# Patient Record
Sex: MALE | Race: WHITE | NOT HISPANIC OR LATINO | Employment: FULL TIME | URBAN - NONMETROPOLITAN AREA
[De-identification: names, ages, dates, MRNs, and addresses within clinical notes are randomized per-mention and may not be internally consistent; named-entity substitution may affect disease eponyms.]

---

## 2018-04-02 ENCOUNTER — OFFICE VISIT (OUTPATIENT)
Dept: URGENT CARE | Facility: PHYSICIAN GROUP | Age: 58
End: 2018-04-02

## 2018-04-02 VITALS
HEIGHT: 73 IN | OXYGEN SATURATION: 96 % | SYSTOLIC BLOOD PRESSURE: 126 MMHG | DIASTOLIC BLOOD PRESSURE: 84 MMHG | TEMPERATURE: 98.3 F | HEART RATE: 82 BPM | WEIGHT: 161 LBS | BODY MASS INDEX: 21.34 KG/M2

## 2018-04-02 DIAGNOSIS — Z02.4 ENCOUNTER FOR CDL (COMMERCIAL DRIVING LICENSE) EXAM: ICD-10-CM

## 2018-04-02 PROCEDURE — 7100 PR DOT PHYSICAL: Performed by: PHYSICIAN ASSISTANT

## 2018-04-02 NOTE — PROGRESS NOTES
58 y.o. male comes in for aDOT physical. No major medical history, no chronic conditions, no chronic medications. No history of asthma, heart disease, murmurs, seizure disorder or syncopal episodes with activity.  Please see the chart for further information, however normal physical exam, cleared for 2 year certificatet without restrictions.

## 2020-03-18 ENCOUNTER — OCCUPATIONAL MEDICINE (OUTPATIENT)
Dept: URGENT CARE | Facility: PHYSICIAN GROUP | Age: 60
End: 2020-03-18
Payer: COMMERCIAL

## 2020-03-18 ENCOUNTER — NON-PROVIDER VISIT (OUTPATIENT)
Dept: URGENT CARE | Facility: PHYSICIAN GROUP | Age: 60
End: 2020-03-18

## 2020-03-18 VITALS
TEMPERATURE: 98 F | RESPIRATION RATE: 16 BRPM | SYSTOLIC BLOOD PRESSURE: 138 MMHG | BODY MASS INDEX: 20.94 KG/M2 | HEART RATE: 84 BPM | OXYGEN SATURATION: 98 % | WEIGHT: 158 LBS | DIASTOLIC BLOOD PRESSURE: 90 MMHG | HEIGHT: 73 IN

## 2020-03-18 DIAGNOSIS — Z02.1 PRE-EMPLOYMENT DRUG SCREENING: ICD-10-CM

## 2020-03-18 DIAGNOSIS — S46.912A STRAIN OF LEFT SHOULDER, INITIAL ENCOUNTER: ICD-10-CM

## 2020-03-18 LAB
AMP AMPHETAMINE: NORMAL
COC COCAINE: NORMAL
INT CON NEG: NORMAL
INT CON POS: NORMAL
MET METHAMPHETAMINES: NORMAL
OPI OPIATES: NORMAL
PCP PHENCYCLIDINE: NORMAL
POC DRUG COMMENT 753798-OCCUPATIONAL HEALTH: NEGATIVE
THC: NORMAL

## 2020-03-18 PROCEDURE — 80305 DRUG TEST PRSMV DIR OPT OBS: CPT | Performed by: PHYSICIAN ASSISTANT

## 2020-03-18 PROCEDURE — 99213 OFFICE O/P EST LOW 20 MIN: CPT | Mod: 29 | Performed by: PHYSICIAN ASSISTANT

## 2020-03-18 NOTE — LETTER
[facility]  [facility] [address]  [place] [address]  Phone:  [phone] - Fax:  [phone]   [facility] Progress Report and Disability Certification  Date of Service: 3/18/2020   No Show:  No  Date / Time of Next Visit: 3/25/2020   Claim Information   Patient Name: Ashwin Barrios  Claim Number:     Employer: [employer]  Date of Injury: 3/12/2020     Insurer / TPA: [facility]  ID / SSN:     Occupation: SANITATION    Diagnosis: The encounter diagnosis was Strain of left shoulder, initial encounter.    Medical Information   Related to Industrial Injury? Yes    Subjective Complaints:  Left shoulder pain after lifting a pallet at work   Objective Findings:  Left shoulder is without any visual deformity, erythema, edema or ecchymosis.  He does have some tenderness to palpation to the lateral joint line.  He has good distal circulation and sensation.  Range of motion is preserved but painful, particularly with flexion and extension of the shoulder as well as abduction.     Pre-Existing Condition(s):     Assessment:   Initial Visit    Status: Additional Care Required  Comments:Follow-up in 1 week  Permanent Disability:No    Plan: Medication  Comments:Over-the-counter anti-inflammatories    Diagnostics:      Comments:       Disability Information   Status: Released to Restricted Duty    From:  3/18/2020  Through: 3/25/2020 Restrictions are: Temporary   Physical Restrictions   Sitting:    Standing:    Stooping:    Bending:      Squatting:    Walking:    Climbin hrs/day Pushin hrs/day  Comments:With left arm   Pullin hrs/day  Comments:With left arm Other:    Reaching Above Shoulder (L): 0 hrs/day Reaching Above Shoulder (R):       Reaching Below Shoulder (L):  0 hrs/day Reaching Below Shoulder (R):      Not to exceed Weight Limits   Carrying(hrs):   Weight Limit(lb): < or = to 10 pounds  Comments:With left arm Lifting(hrs):   Weight  Limit(lb): <  or = to 10 pounds  Comments:With left arm   Comments: No pushing, pulling or lifting more than 10 pounds with left arm.  No operating heavy machinery, no climbing ladders.    Repetitive Actions   Hands: i.e. Fine Manipulations from Grasping:     Feet: i.e. Operating Foot Controls:     Driving / Operate Machinery:     Physician Name: Linda Obrien P.A.-C. Physician Signature: LINDA [place] P.A.-C. e-Signature: Dr. Amando Jain, Medical Director   Clinic Name / Location: [address] [address] Clinic Phone Number: Dept: [phone]   Appointment Time: 9:35 Am Visit Start Time: 10:07 AM   Check-In Time:  9:45 Am Visit Discharge Time:  10:27 AM   Original-Treating Physician or Chiropractor    Page 2-Insurer/TPA    Page 3-Employer    Page 4-Employee

## 2020-03-18 NOTE — LETTER
"EMPLOYEE’S CLAIM FOR COMPENSATION/ REPORT OF INITIAL TREATMENT  FORM C-4    EMPLOYEE’S CLAIM - PROVIDE ALL INFORMATION REQUESTED   First Name  Ashwin Last Name  Cross Birthdate                    1960                Sex  male Claim Number   Home Address  [place] Age  60 y.o. 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  [address]    Date of Injury  3/12/2020               Hour of Injury  3:00 PM Date Employer Notified  3/18/2020 Last Day of Work after Injury or Occupational Disease  3/17/2020 Supervisor to Whom Injury Reported  STEPHANI    Address or Location of Accident (if applicable)  [IN ALLEY NEAR [address] [place] [address] ]   What were you doing at the time of accident? (if applicable)  LIFTING A HEAVY  PALET     How did this injury or occupational disease occur? (Be specific an answer in detail. Use additional sheet if necessary)  LIFTING A HEAVY PALET INTO A TRAILER    If you believe that you have an occupational disease, when did you first have knowledge of the disability and it relationship to your employment?  NA Witnesses to the Accident  JOSE MIGUEL       Nature of Injury or Occupational Disease  Workers' Compensation  Part(s) of Body Injured or Affected  Shoulder (L), ,     I certify that the above is true and correct to the best of my knowledge and that I have provided this information in order to obtain the benefits of [place]  I hereby authorize any physician, chiropractor, surgeon, practitioner, or other person, any hospital, including [facility] or [facility] hospital, any medical service organization, any insurance company, or other institution or organization to release to each other, any medical or other information, including benefits paid or payable, pertinent to this injury or disease, except information relative to diagnosis, treatment and/or counseling for AIDS, psychological conditions, alcohol or controlled substances, for which I must give specific authorization.  A Photostat of this authorization shall be as valid as the original.     Date 03/18/2020   Place [facility]   Employee’s Signature   THIS REPORT MUST BE COMPLETED AND MAILED WITHIN 3 WORKING DAYS OF TREATMENT   Place  [facility]  Name of [facility]   Date  3/18/2020 Diagnosis  (S46.912A) Strain of left shoulder, initial encounter Is there evidence the injured employee was under the influence of alcohol and/or another controlled substance at the time of accident?   Hour  10:07 AM Description of Injury or Disease  The encounter diagnosis was Strain of left shoulder, initial encounter. No   Treatment  Rest, ice, over-the-counter anti-inflammatories, gentle range of motion  Have you advised the patient to remain off work five days or more? No   X-Ray Findings      If Yes   From Date  To Date      From information given by the employee, together with medical evidence, can you directly connect this injury or occupational disease as job incurred?  Yes If No Full Duty No Modified Duty  Yes   Is additional medical care by a physician indicated?  Yes  Comments:Follow-up in a week If Modified Duty, Specify any Limitations / Restrictions  No lifting more than 10 pounds with left arm, no climbing ladders, no operating heavy machinery.   Do you know of any previous injury or  "disease contributing to this condition or occupational disease?                            No   Date  3/18/2020 Print Doctor’s Name Linda Obrien P.A.-C. I certify the employer’s copy of  this form was mailed on:   Address  [address] Insurer’s Use Only     [facility] Zip  [address]    Provider’s Tax ID Number  [id number] Telephone  Dept: [phone]        e-TanyaTALINDA OWENS P.A.-C.   e-Signature: Dr. Amando Jain,   Medical Director Degree  MD        ORIGINAL-TREATING PHYSICIAN OR CHIROPRACTOR    PAGE 2-INSURER/TPA    PAGE 3-EMPLOYER    PAGE 4-EMPLOYEE             Form C-4 (rev.10/07)              BRIEF DESCRIPTION OF RIGHTS AND BENEFITS  (Pursuant to [place] 616C.050)    Notice of Injury or Occupational Disease (Incident Report Form C-1): If an injury or occupational disease (OD) arises out of and in the course of employment, you must provide written notice to your employer as soon as practicable, but no later than 7 days after the accident or OD. Your employer shall maintain a sufficient supply of the required forms.    Claim for Compensation (Form C-4): If medical treatment is sought, the form C-4 is available at the place of initial treatment. A completed \"Claim for Compensation\" (Form C-4) must be filed within 90 days after an accident or OD. The treating physician or chiropractor must, within 3 working days after treatment, complete and mail to the employer, the employer's insurer and third-party , the Claim for Compensation.    Medical Treatment: If you require medical treatment for your on-the-job injury or OD, you may be required to select a physician or chiropractor from a list provided by your workers’ compensation insurer, if it has contracted with an Organization for Managed Care (MCO) or Preferred Provider Organization (PPO) or providers of health care. If your employer has not entered into a contract with an MCO or PPO, you may select a physician or chiropractor " from the Panel of Physicians and Chiropractors. Any medical costs related to your industrial injury or OD will be paid by your insurer.    Temporary Total Disability (TTD): If your doctor has certified that you are unable to work for a period of at least 5 consecutive days, or 5 cumulative days in a 20-day period, or places restrictions on you that your employer does not accommodate, you may be entitled to TTD compensation.    Temporary Partial Disability (TPD): If the wage you receive upon reemployment is less than the compensation for TTD to which you are entitled, the insurer may be required to pay you TPD compensation to make up the difference. TPD can only be paid for a maximum of 24 months.    Permanent Partial Disability (PPD): When your medical condition is stable and there is an indication of a PPD as a result of your injury or OD, within 30 days, your insurer must arrange for an evaluation by a rating physician or chiropractor to determine the degree of your PPD. The amount of your PPD award depends on the date of injury, the results of the PPD evaluation and your age and wage.    Permanent Total Disability (PTD): If you are medically certified by a treating physician or chiropractor as permanently and totally disabled and have been granted a PTD status by your insurer, you are entitled to receive monthly benefits not to exceed 66 2/3% of your average monthly wage. The amount of your PTD payments is subject to reduction if you previously received a PPD award.    Vocational Rehabilitation Services: You may be eligible for vocational rehabilitation services if you are unable to return to the job due to a permanent physical impairment or permanent restrictions as a result of your injury or occupational disease.    Transportation and Per Chelsey Reimbursement: You may be eligible for travel expenses and per chelsey associated with medical treatment.    Reopening: You may be able to reopen your claim if your  condition worsens after claim closure.    Appeal Process: If you disagree with a written determination issued by the insurer or the insurer does not respond to your request, you may appeal to the Department of Administration, , by following the instructions contained in your determination letter. You must appeal the determination within 70 days from the date of the determination letter at [address], [address], [place], [place] [address], or [address], [address],  [address]. If you disagree with the  decision, you may appeal to the Department of Administration, . You must file your appeal within 30 days from the date of the  decision letter at [address], [address], [place], [place] [address], or [address] [facility] [address] [facility] [address],  [address]. If you disagree with a decision of an , you may file a petition for judicial review with the District Court. You must do so within 30 days of the Appeal Officer’s decision. You may be represented by an  at your own expense or you may contact the [facility] for possible representation.    [place]  for Injured Workers (NAIW): If you disagree with a  decision, you may request that NAIW represent you without charge at an  Hearing. For information regarding denial of benefits, you may contact the [facility] at: [address], [address], [place] [address], [phone], or [address] [facility] [address], [place] [address], [phone]    To File a Complaint with the Division: If you wish to file a complaint with the  of the Division of Industrial Relations (DIR),  please contact the Workers’ Compensation Section, [address], [address], [place], [place] [address], telephone [phone], or [address],  [address], telephone [phone]  [phone].    For assistance with Workers’ Compensation Issues: You may contact the Office of the Governor Consumer Health Assistance, [address] [place] [address], [address], [place] [address], Toll Free 1-679.239.4620, Web site: [email or web address]., E-mail [email or web address].                   03/18/2020        __________________________________________________________________                                                     _________        Employee Name / Signature                                                                                                                                              Date                                                                                                                                                                                                     D-2 (rev. 06/18)

## 2020-03-18 NOTE — PROGRESS NOTES
"Chief Complaint   Patient presents with   • Shoulder Injury     L shoulder        HISTORY OF PRESENT ILLNESS: Patient is a 60 y.o. male who presents today because he is here for a Worker's Comp. injury.    Date of injury 3/12/2000 2020.  This is his first visit in urgent care.  He was lifting a pallet at work and felt a pain and pop in his left shoulder.  He has had pain ever since.  He has tried some over-the-counter ibuprofen sporadically.  Denies any distal paresthesias.  He has not been icing it.  He does note lifting makes it worse    There are no active problems to display for this patient.      Allergies:Patient has no known allergies.    Current Outpatient Medications Ordered in Epic   Medication Sig Dispense Refill   • Multiple Vitamin (MULTI VITAMIN DAILY PO) Take  by mouth.       No current Epic-ordered facility-administered medications on file.        History reviewed. No pertinent past medical history.    Social History     Tobacco Use   • Smoking status: Current Every Day Smoker   • Smokeless tobacco: Former User   Substance Use Topics   • Alcohol use: Yes     Comment: Seldom   • Drug use: No       No family status information on file.   History reviewed. No pertinent family history.    ROS:  Review of Systems   Constitutional: Negative for fever, chills, weight loss and malaise/fatigue.   HENT: Negative for ear pain, nosebleeds, congestion, sore throat and neck pain.    Eyes: Negative for blurred vision.   Respiratory: Negative for cough, sputum production, shortness of breath and wheezing.    Cardiovascular: Negative for chest pain, palpitations, orthopnea and leg swelling.   Gastrointestinal: Negative for heartburn, nausea, vomiting and abdominal pain.   Genitourinary: Negative for dysuria, urgency and frequency.     Exam:  /90 (BP Location: Right arm, Patient Position: Sitting, BP Cuff Size: Small adult)   Pulse 84   Temp 36.7 °C (98 °F) (Temporal)   Resp 16   Ht 1.854 m (6' 1\")   Wt " 71.7 kg (158 lb)   SpO2 98%   General:  Well nourished, well developed male in NAD  Head:Normocephalic, atraumatic  Eyes: PERRLA, EOM within normal limits, no conjunctival injection, no scleral icterus, visual fields and acuity grossly intact.  Nose: Symmetrical without tenderness, no discharge.  Mouth: reasonable hygiene, no erythema exudates or tonsillar enlargement.  Neck: no masses, range of motion within normal limits, no tracheal deviation. No obvious thyroid enlargement.  Extremities: no clubbing, cyanosis, or edema.  Left shoulder is without any visual deformity, erythema, edema or ecchymosis.  He does have some tenderness to palpation to the lateral joint line.  He has good distal circulation and sensation.  Range of motion is preserved but painful, particularly with flexion and extension of the shoulder as well as abduction.    Please note that this dictation was created using voice recognition software. I have made every reasonable attempt to correct obvious errors, but I expect that there are errors of grammar and possibly content that I did not discover before finalizing the note.    Assessment/Plan:  1. Strain of left shoulder, initial encounter         Restrictions, ice, over-the-counter anti-inflammatories, follow-up in a week

## 2020-03-25 ENCOUNTER — OCCUPATIONAL MEDICINE (OUTPATIENT)
Dept: URGENT CARE | Facility: PHYSICIAN GROUP | Age: 60
End: 2020-03-25
Payer: COMMERCIAL

## 2020-03-25 VITALS
HEART RATE: 100 BPM | HEIGHT: 73 IN | BODY MASS INDEX: 20.94 KG/M2 | TEMPERATURE: 98.1 F | DIASTOLIC BLOOD PRESSURE: 80 MMHG | OXYGEN SATURATION: 98 % | WEIGHT: 158 LBS | RESPIRATION RATE: 14 BRPM | SYSTOLIC BLOOD PRESSURE: 142 MMHG

## 2020-03-25 DIAGNOSIS — M75.22 BICEPS TENDINITIS OF LEFT UPPER EXTREMITY: ICD-10-CM

## 2020-03-25 DIAGNOSIS — S46.912D STRAIN OF LEFT SHOULDER, SUBSEQUENT ENCOUNTER: ICD-10-CM

## 2020-03-25 PROCEDURE — 99214 OFFICE O/P EST MOD 30 MIN: CPT | Performed by: PHYSICIAN ASSISTANT

## 2020-03-25 RX ORDER — METHYLPREDNISOLONE 4 MG/1
4 TABLET ORAL SEE ADMIN INSTRUCTIONS
Qty: 21 TAB | Refills: 0 | Status: SHIPPED | OUTPATIENT
Start: 2020-03-25 | End: 2020-12-15

## 2020-03-25 NOTE — PROGRESS NOTES
Chief Complaint   Patient presents with   • Follow-Up       HISTORY OF PRESENT ILLNESS: Patient is a 60 y.o. male who presents today because he is following up on a work comp visit.    Date of injury is 3/12/2020, this is his second visit in urgent care.  He was lifting a pallet at work and felt a pain and pop in his left shoulder.  He came into the urgent care 6 days after the injury.  He had somewhat reduced range of motion secondary to pain.  He was advised to use over-the-counter anti-inflammatories, ice, put on restrictions and follow-up today.    Since his last visit 1 week ago, he started to have some improvement after the first 3 days but it has slowly gotten worse after that, now having significant pain in the anterior portion of the left shoulder, having difficulty and pain with flexion of his left shoulder despite using ice, anti-inflammatories    There are no active problems to display for this patient.      Allergies:Patient has no known allergies.    Current Outpatient Medications Ordered in Epic   Medication Sig Dispense Refill   • methylPREDNISolone (MEDROL DOSEPAK) 4 MG Tablet Therapy Pack Take 1 Tab by mouth See Admin Instructions. 21 Tab 0   • Multiple Vitamin (MULTI VITAMIN DAILY PO) Take  by mouth.       No current Epic-ordered facility-administered medications on file.        No past medical history on file.    Social History     Tobacco Use   • Smoking status: Current Every Day Smoker   • Smokeless tobacco: Former User   Substance Use Topics   • Alcohol use: Yes     Comment: Seldom   • Drug use: No       No family status information on file.   No family history on file.    ROS:  Review of Systems   Constitutional: Negative for fever, chills, weight loss and malaise/fatigue.   HENT: Negative for ear pain, nosebleeds, congestion, sore throat and neck pain.    Eyes: Negative for blurred vision.   Respiratory: Negative for cough, sputum production, shortness of breath and wheezing.   "  Cardiovascular: Negative for chest pain, palpitations, orthopnea and leg swelling.   Gastrointestinal: Negative for heartburn, nausea, vomiting and abdominal pain.   Genitourinary: Negative for dysuria, urgency and frequency.     Exam:  /80 (BP Location: Right arm, Patient Position: Sitting, BP Cuff Size: Adult)   Pulse 100   Temp 36.7 °C (98.1 °F) (Temporal)   Resp 14   Ht 1.854 m (6' 1\")   Wt 71.7 kg (158 lb)   SpO2 98%   General:  Well nourished, well developed male in NAD  Head:Normocephalic, atraumatic  Eyes: PERRLA, EOM within normal limits, no conjunctival injection, no scleral icterus, visual fields and acuity grossly intact.  Nose: Symmetrical without tenderness, no discharge.  Mouth: reasonable hygiene, no erythema exudates or tonsillar enlargement.  Neck: no masses, range of motion within normal limits, no tracheal deviation. No obvious thyroid enlargement.  Extremities: no clubbing, cyanosis, or edema.  Left shoulder has tenderness to palpation in the bicipital groove, no visual deformity.  He has worsening pain and reduction in range of motion with flexion of the shoulder and a positive speeds test    Please note that this dictation was created using voice recognition software. I have made every reasonable attempt to correct obvious errors, but I expect that there are errors of grammar and possibly content that I did not discover before finalizing the note.    Assessment/Plan:  1. Strain of left shoulder, subsequent encounter  methylPREDNISolone (MEDROL DOSEPAK) 4 MG Tablet Therapy Pack   2. 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  Will consider physical therapy at that time           "

## 2020-03-25 NOTE — LETTER
[facility]  [address]  [place] [address]  Phone:  [phone] - Fax:  [phone]   [facility]ational [facility] Progress Report and Disability Certification  Date of Service: 3/25/2020   No Show:  No  Date / Time of Next Visit: 2020   Claim Information   Patient Name: Ashwin Barrios  Claim Number:     Employer: [employer]  Date of Injury: 3/12/2020     Insurer / TPA: [employer] [facility]  ID / SSN:     Occupation: SANITATION    Diagnosis: Diagnoses of Strain of left shoulder, subsequent encounter and Biceps tendinitis of left upper extremity were pertinent to this visit.    Medical Information   Related to Industrial Injury? Yes    Subjective Complaints:  Worsening pain in the left anterior shoulder area   Objective Findings: Extremities: no clubbing, cyanosis, or edema.  Left shoulder has tenderness to palpation in the bicipital groove, no visual deformity.  He has worsening pain and reduction in range of motion with flexion of the shoulder and a positive speeds test     Pre-Existing Condition(s):     Assessment:   Condition Worsened    Status: Additional Care Required  Comments:Follow-up in 1 week  Permanent Disability:No    Plan: Medication    Diagnostics:      Comments:       Disability Information   Status: Released to Restricted Duty    From:  3/25/2020  Through: 2020 Restrictions are: Temporary   Physical Restrictions   Sitting:    Standing:    Stooping:    Bending:      Squatting:    Walking:    Climbin hrs/day Pushing:    Comments:No pushing with left arm   Pulling:    Comments:No pulling with left arm Other:    Reaching Above Shoulder (L): 0 hrs/day Reaching Above Shoulder (R):       Reaching Below Shoulder (L):  0 hrs/day Reaching Below Shoulder (R):      Not to exceed Weight Limits   Carrying(hrs):   Weight Limit(lb):   Comments:No lifting with left arm Lifting(hrs):   Weight  Limit(lb):     Comments: 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 Fine Manipulations from Grasping:     Feet: i.e. Operating Foot Controls:     Driving / Operate Machinery:     Physician Name: Linda Obrien P.A.-C. Physician Signature: LINDA [place] P.A.-C. e-Signature: Dr. Amando Jain, Medical Director   Clinic Name / Location: [address] [address] Clinic Phone Number: Dept: [phone]   Appointment Time: 10:40 Am Visit Start Time: 10:51 AM   Check-In Time:  10:40 Am Visit Discharge Time:  11:15 am   Original-Treating Physician or Chiropractor    Page 2-Insurer/TPA    Page 3-Employer    Page 4-Employee

## 2020-04-01 ENCOUNTER — OCCUPATIONAL MEDICINE (OUTPATIENT)
Dept: URGENT CARE | Facility: PHYSICIAN GROUP | Age: 60
End: 2020-04-01
Payer: COMMERCIAL

## 2020-04-01 VITALS
TEMPERATURE: 98.9 F | WEIGHT: 158 LBS | HEIGHT: 73 IN | DIASTOLIC BLOOD PRESSURE: 84 MMHG | OXYGEN SATURATION: 97 % | SYSTOLIC BLOOD PRESSURE: 138 MMHG | BODY MASS INDEX: 20.94 KG/M2 | HEART RATE: 90 BPM | RESPIRATION RATE: 16 BRPM

## 2020-04-01 DIAGNOSIS — S46.919A: ICD-10-CM

## 2020-04-01 DIAGNOSIS — M75.20 BICEPS TENDINITIS: ICD-10-CM

## 2020-04-01 PROCEDURE — 99214 OFFICE O/P EST MOD 30 MIN: CPT | Performed by: PHYSICIAN ASSISTANT

## 2020-04-01 NOTE — LETTER
[facility]  [address]  [place] [address]  Phone:  [phone] - Fax:  [phone]   [facility] Progress Report and Disability Certification  Date of Service: 2020   No Show:  No  Date / Time of Next Visit: 2020   Claim Information   Patient Name: Ashwin Barrios  Claim Number:     Employer: [employer]  Date of Injury: 3/12/2020     Insurer / TPA: [employer] [facility]  ID / SSN:     Occupation: SANITATION    Diagnosis: Diagnoses of Biceps tendinitis and Strain of shoulder were pertinent to this visit.    Medical Information   Related to Industrial Injury? Yes    Subjective Complaints:  Continued left arm pain and reduced range of motion and strength   Objective Findings:   Left arm is without any visible deformity, range of motion is reduced in all planes secondary to pain     Pre-Existing Condition(s):     Assessment:   Condition Same    Status: Additional Care Required  Comments:Follow-up with occupational health  Permanent Disability:No    Plan: Medication  Comments:Over-the-counter anti-inflammatory    Diagnostics:      Comments:       Disability Information   Status: Released to Restricted Duty    From:  2020  Through: 2020 Restrictions are: Temporary   Physical Restrictions   Sitting:    Standing:    Stooping:    Bending:      Squatting:    Walking:    Climbin hrs/day Pushin hrs/day  Comments:With left arm   Pullin hrs/day  Comments:With left arm Other:    Reaching Above Shoulder (L): 0 hrs/day Reaching Above Shoulder (R):       Reaching Below Shoulder (L):  0 hrs/day Reaching Below Shoulder (R):      Not to exceed Weight Limits   Carrying(hrs):   Weight Limit(lb): < or = to 10 pounds  Comments:With left arm Lifting(hrs):   Weight  Limit(lb): < or = to 10 pounds  Comments:With left arm   Comments: No lifting, pushing, pulling more than 10 pounds with left arm, no operation of commercial vehicles.    Repetitive  Actions   Hands: i.e. Fine Manipulations from Grasping:     Feet: i.e. Operating Foot Controls:     Driving / Operate Machinery:     Physician Name: Jair Obrien P.A.-C. Physician Signature:   e-Signature: Dr. Amando Jain, Medical Director   Clinic Name / Location: [address] [address] Clinic Phone Number: Dept: [phone]   Appointment Time: 10:00 Am Visit Start Time: 10:25 AM   Check-In Time:  9:49 Am Visit Discharge Time:  10:51 AM   Original-Treating Physician or Chiropractor    Page 2-Insurer/TPA    Page 3-Employer    Page 4-Employee

## 2020-04-01 NOTE — PROGRESS NOTES
Chief Complaint   Patient presents with   • Follow-Up       HISTORY OF PRESENT ILLNESS: Patient is a 60 y.o. male who presents today because he is here for a Worker's Comp. follow-up visit.    Date of injury was 3/12/2020.  This is his third visit in urgent care.    He was lifting a pallet at work and felt a pop and pain in his left shoulder and came to the urgent care 6 days later with somewhat reduced range of motion.  He had a visit 1 week ago and it was not improving significantly using over-the-counter anti-inflammatories and ice.  He was prescribed a Medrol Dosepak.  Since his last visit 1 week ago, he took the Medrol Dosepak and is still having reduced strength and range of motion with his left arm.    There are no active problems to display for this patient.      Allergies:Patient has no known allergies.    Current Outpatient Medications Ordered in Epic   Medication Sig Dispense Refill   • methylPREDNISolone (MEDROL DOSEPAK) 4 MG Tablet Therapy Pack Take 1 Tab by mouth See Admin Instructions. 21 Tab 0   • Multiple Vitamin (MULTI VITAMIN DAILY PO) Take  by mouth.       No current Epic-ordered facility-administered medications on file.        History reviewed. No pertinent past medical history.    Social History     Tobacco Use   • Smoking status: Current Every Day Smoker   • Smokeless tobacco: Former User   Substance Use Topics   • Alcohol use: Yes     Comment: Seldom   • Drug use: No       No family status information on file.   History reviewed. No pertinent family history.    ROS:  Review of Systems   Constitutional: Negative for fever, chills, weight loss and malaise/fatigue.   HENT: Negative for ear pain, nosebleeds, congestion, sore throat and neck pain.    Eyes: Negative for blurred vision.   Respiratory: Negative for cough, sputum production, shortness of breath and wheezing.    Cardiovascular: Negative for chest pain, palpitations, orthopnea and leg swelling.   Gastrointestinal: Negative for  "heartburn, nausea, vomiting and abdominal pain.   Genitourinary: Negative for dysuria, urgency and frequency.     Exam:  /84 (BP Location: Right arm, Patient Position: Sitting, BP Cuff Size: Small adult)   Pulse 90   Temp 37.2 °C (98.9 °F) (Temporal)   Resp 16   Ht 1.854 m (6' 1\")   Wt 71.7 kg (158 lb)   SpO2 97%   General:  Well nourished, well developed male in NAD  Head:Normocephalic, atraumatic  Eyes: PERRLA, EOM within normal limits, no conjunctival injection, no scleral icterus, visual fields and acuity grossly intact.  Nose: Symmetrical without tenderness, no discharge.  Mouth: reasonable hygiene, no erythema exudates or tonsillar enlargement.  Neck: no masses, range of motion within normal limits, no tracheal deviation. No obvious thyroid enlargement.  Extremities: no clubbing, cyanosis, or edema.  Left arm is without any visible deformity, range of motion is reduced in all planes secondary to pain    Please note that this dictation was created using voice recognition software. I have made every reasonable attempt to correct obvious errors, but I expect that there are errors of grammar and possibly content that I did not discover before finalizing the note.    Assessment/Plan:  1. Biceps tendinitis  REFERRAL TO PHYSICAL THERAPY Reason for Therapy: Eval/Treat/Report   2. Strain of shoulder  REFERRAL TO PHYSICAL THERAPY Reason for Therapy: Eval/Treat/Report   Physical therapy ordered, follow-up with occupational health in 4 weeks.  Has failed conservative therapy, will follow-up with occupational health         "

## 2020-04-29 ENCOUNTER — OCCUPATIONAL MEDICINE (OUTPATIENT)
Dept: URGENT CARE | Facility: CLINIC | Age: 60
End: 2020-04-29
Payer: COMMERCIAL

## 2020-04-29 VITALS
RESPIRATION RATE: 12 BRPM | HEART RATE: 106 BPM | OXYGEN SATURATION: 97 % | WEIGHT: 169.53 LBS | HEIGHT: 73 IN | SYSTOLIC BLOOD PRESSURE: 138 MMHG | TEMPERATURE: 99 F | BODY MASS INDEX: 22.47 KG/M2 | DIASTOLIC BLOOD PRESSURE: 74 MMHG

## 2020-04-29 DIAGNOSIS — S46.912D STRAIN OF LEFT SHOULDER, SUBSEQUENT ENCOUNTER: ICD-10-CM

## 2020-04-29 DIAGNOSIS — M75.22 BICEPS TENDINITIS OF LEFT UPPER EXTREMITY: ICD-10-CM

## 2020-04-29 PROCEDURE — 99213 OFFICE O/P EST LOW 20 MIN: CPT | Performed by: NURSE PRACTITIONER

## 2020-04-29 SDOH — HEALTH STABILITY: MENTAL HEALTH: HOW OFTEN DO YOU HAVE A DRINK CONTAINING ALCOHOL?: 2-4 TIMES A MONTH

## 2020-04-29 ASSESSMENT — ENCOUNTER SYMPTOMS
MYALGIAS: 1
TINGLING: 0
RESPIRATORY NEGATIVE: 1
CARDIOVASCULAR NEGATIVE: 1
CONSTITUTIONAL NEGATIVE: 1
PSYCHIATRIC NEGATIVE: 1
WEAKNESS: 1

## 2020-04-29 ASSESSMENT — PAIN SCALES - GENERAL: PAINLEVEL: 6=MODERATE PAIN

## 2020-04-29 NOTE — LETTER
[facility]  [address] [place] [address]  Phone:  [phone] - Fax:  [phone]   [facility] Progress Report and Disability Certification  Date of Service: 4/29/2020   No Show:  No  Date / Time of Next Visit: 5/13/2020 @ 1:00 PM   Claim Information   Patient Name: Ashwin Barrios  Claim Number:     Employer: [employer]  Date of Injury: 3/12/2020     Insurer / TPA: [employer] [facility]  ID / SSN:     Occupation: SANITATION    Diagnosis: Diagnoses of Biceps tendinitis of left upper extremity and Strain of left shoulder, subsequent encounter were pertinent to this visit.    Medical Information   Related to Industrial Injury? Yes    Subjective Complaints:  Date of injury 3/12/2000 2020.  This is his first visit in urgent care.  He was lifting a pallet at work and felt a pain and pop in his left shoulder.  He has had pain ever since.  He has tried some over-the-counter ibuprofen sporadically.  Denies any distal paresthesias.  He has not been icing it.  He does note lifting makes it worse    Today no improvement. Pain is described as pulling sensation, radiating, sharp, weakness, and achy. Patient denies numbness, tingling, or decreased range of motion. Patient is taking Naproxen every other day with mild relief. Patient did have one day of relief after the Medrol Dosepak, but the pain returned instantly. Patient is using both heat and ice with moderate relief. Patient is tolerating work without difficulty. Patient has been doing physical therapy with mild relief, he does note improved strength. Plan of care discussed with patient.    Objective Findings: Left shoulder: No obvious deformities. Neg erythema, edema or ecchymosis.  Moderate tenderness to palpation to the lateral joint line and rotator cuff region. Distal neurovascular sensation .  Range of motion is preserved but painful, particularly with flexion and extension of the shoulder as well  as abduction.   strength is 4/5.    Pre-Existing Condition(s):     Assessment:   Condition Same    Status: Additional Care Required  Permanent Disability:No    Plan: Diagnostics    Diagnostics: MRI    Comments:  Follow-up in 2 weeks  Restricted duty  MRI ordered  Continue physical therapy appointments as scheduled  Continue with OTC naproxen as needed  Continue with heat and ice as needed  Continue with gentle range of motion and stretching exercises as tole  rated  Try topical OTC ointments/creams as needed for symptoms    Disability Information   Status: Released to Restricted Duty    From:  4/29/2020  Through: 5/13/2020 Restrictions are: Temporary   Physical Restrictions   Sitting:    Standing:    Stooping:    Bending:      Squatting:    Walking:    Climbing:    Pushing:      Pulling:    Other:    Reaching Above Shoulder (L):   Reaching Above Shoulder (R):       Reaching Below Shoulder (L):    Reaching Below Shoulder (R):      Not to exceed Weight Limits   Carrying(hrs):   Weight Limit(lb): < or = to 25 pounds  Comments:Left arm only Lifting(hrs):   Weight  Limit(lb): < or = to 25 pounds  Comments:Left arm only   Comments:      Repetitive Actions   Hands: i.e. Fine Manipulations from Grasping:     Feet: i.e. Operating Foot Controls:     Driving / Operate Machinery:     Physician Name: LANCE Johnson Physician Signature:   e-Signature: Dr. Amando Jain, Medical Director   Clinic Name / Location: [address], [address] [place] [address] Clinic Phone Number: Dept: [phone]   Appointment Time: 1:00 Pm Visit Start Time: 12:36 PM   Check-In Time:  12:28 Pm Visit Discharge Time:  1:29 PM   Original-Treating Physician or Chiropractor    Page 2-Insurer/TPA    Page 3-Employer    Page 4-Employee

## 2020-04-29 NOTE — PROGRESS NOTES
"Subjective:      Ashwin Barrios is a 60 y.o. male who presents with Other (WC (03/13/2020 left shoulder #6))      Date of injury 3/12/2000 2020.  This is his first visit in urgent care.  He was lifting a pallet at work and felt a pain and pop in his left shoulder.  He has had pain ever since.  He has tried some over-the-counter ibuprofen sporadically.  Denies any distal paresthesias.  He has not been icing it.  He does note lifting makes it worse    Today no improvement. Pain is described as pulling sensation, radiating, sharp, weakness, and achy. Patient denies numbness, tingling, or decreased range of motion. Patient is taking Naproxen every other day with mild relief. Patient did have one day of relief after the Medrol Dosepak, but the pain returned instantly. Patient is using both heat and ice with moderate relief. Patient is tolerating work without difficulty. Patient has been doing physical therapy with mild relief, he does note improved strength. Plan of care discussed with patient.      HPI    Review of Systems   Constitutional: Negative.    Respiratory: Negative.    Cardiovascular: Negative.    Musculoskeletal: Positive for joint pain and myalgias.   Skin: Negative.    Neurological: Positive for weakness. Negative for tingling.   Psychiatric/Behavioral: Negative.      ROS: All systems were reviewed on intake form, form was reviewed and signed. See scanned documents in media. Pertinent positives and negatives included in HPI.    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     Appearance: Normal appearance. He is not ill-appearing.   Cardiovascular:      Rate and Rhythm: Normal rate and regular rhythm.      Pulses: Normal pulses.   Pulmonary:      Effort: Pulmonary effort is normal.   Musculoskeletal: Normal range of motion.         General: Tenderness and signs of injury present. No swelling or deformity.   Skin:     General: Skin is warm and dry.      Findings: No bruising or erythema.   Neurological:      General: No focal deficit present.      Mental Status: He is alert and oriented to person, place, and time.      Cranial Nerves: No cranial nerve deficit.      Sensory: No sensory deficit.      Motor: Weakness present.      Gait: Gait normal.   Psychiatric:         Mood and Affect: Mood normal.         Behavior: Behavior normal.         Left shoulder: No obvious deformities. Neg erythema, edema or ecchymosis.  Moderate tenderness to palpation to the lateral joint line and rotator cuff region. Distal neurovascular sensation .  Range of motion is preserved but painful, particularly with flexion and extension of the shoulder as well as abduction.   strength is 4/5.        Assessment/Plan:       1. Biceps tendinitis of left upper extremity    - MR-SHOULDER-W/O LEFT; Future  - REFERRAL TO RADIOLOGY    2. Strain of left shoulder, subsequent encounter    - MR-SHOULDER-W/O LEFT; Future  - REFERRAL TO RADIOLOGY    Follow-up in 2 weeks   Restricted duty   MRI ordered   Continue physical therapy appointments as scheduled   Continue with OTC naproxen as needed   Continue with heat and ice as needed   Continue with gentle range of motion and stretching exercises as tolerated   Try topical OTC ointments/creams as needed for symptoms

## 2020-05-05 ENCOUNTER — HOSPITAL ENCOUNTER (OUTPATIENT)
Dept: RADIOLOGY | Facility: MEDICAL CENTER | Age: 60
End: 2020-05-05
Attending: NURSE PRACTITIONER
Payer: COMMERCIAL

## 2020-05-05 DIAGNOSIS — M75.22 BICEPS TENDINITIS OF LEFT UPPER EXTREMITY: ICD-10-CM

## 2020-05-05 DIAGNOSIS — S46.912D STRAIN OF LEFT SHOULDER, SUBSEQUENT ENCOUNTER: ICD-10-CM

## 2020-05-05 PROCEDURE — 73221 MRI JOINT UPR EXTREM W/O DYE: CPT | Mod: LT

## 2020-05-13 ENCOUNTER — OCCUPATIONAL MEDICINE (OUTPATIENT)
Dept: URGENT CARE | Facility: CLINIC | Age: 60
End: 2020-05-13
Payer: COMMERCIAL

## 2020-05-13 VITALS
HEIGHT: 73 IN | TEMPERATURE: 99.2 F | BODY MASS INDEX: 22.4 KG/M2 | SYSTOLIC BLOOD PRESSURE: 130 MMHG | DIASTOLIC BLOOD PRESSURE: 82 MMHG | HEART RATE: 76 BPM | WEIGHT: 169 LBS | RESPIRATION RATE: 14 BRPM

## 2020-05-13 DIAGNOSIS — M75.22 BICEPS TENDINITIS OF LEFT UPPER EXTREMITY: ICD-10-CM

## 2020-05-13 DIAGNOSIS — S46.912D STRAIN OF LEFT SHOULDER, SUBSEQUENT ENCOUNTER: ICD-10-CM

## 2020-05-13 DIAGNOSIS — M75.102 TEAR OF LEFT SUPRASPINATUS TENDON: ICD-10-CM

## 2020-05-13 PROCEDURE — 99213 OFFICE O/P EST LOW 20 MIN: CPT | Performed by: NURSE PRACTITIONER

## 2020-05-13 RX ORDER — CYCLOBENZAPRINE HCL 5 MG
5-10 TABLET ORAL 3 TIMES DAILY PRN
Qty: 30 TAB | Refills: 0 | Status: SHIPPED | OUTPATIENT
Start: 2020-05-13 | End: 2020-12-15

## 2020-05-13 ASSESSMENT — ENCOUNTER SYMPTOMS
TINGLING: 0
RESPIRATORY NEGATIVE: 1
SENSORY CHANGE: 1
PSYCHIATRIC NEGATIVE: 1
WEAKNESS: 1
CARDIOVASCULAR NEGATIVE: 1
CONSTITUTIONAL NEGATIVE: 1
MYALGIAS: 1

## 2020-05-13 NOTE — LETTER
[facility]  [address] [place] [address]  Phone:  [phone] - Fax:  [phone]   [facility] Progress Report and Disability Certification  Date of Service: 5/13/2020   No Show:  No  Date / Time of Next Visit:   Discharged / Care Transfer to Orthopedics   Claim Information   Patient Name: Ashwin Barrios  Claim Number:     Employer: [employer]  Date of Injury: 3/12/2020     Insurer / TPA: [employer] [facility]  ID / SSN:     Occupation: [employer]    Diagnosis: Diagnoses of Biceps tendinitis of left upper extremity, Strain of left shoulder, subsequent encounter, and Tear of left supraspinatus tendon were pertinent to this visit.    Medical Information   Related to Industrial Injury? Yes    Subjective Complaints:  Date of injury 3/12/2000 2020.  This is his first visit in urgent care.  He was lifting a pallet at work and felt a pain and pop in his left shoulder.  He has had pain ever since.  He has tried some over-the-counter ibuprofen sporadically.  Denies any distal paresthesias.  He has not been icing it.  He does note lifting makes it worse     Today no improvement. Pain is described as pulling sensation, radiating, sharp, weakness, numbness in the left thumb, and achy. Patient reports that his shoulder is now locking in place at random times. Patient has not pertinent negatives at this time. Patient is taking Naproxen every other day with mild relief. Patient did have one day of relief after the Medrol Dosepak, but the pain returned instantly. Patient is using both heat and ice with moderate relief. Patient is also using Salonpas pads with no relief. Patient is tolerating work without difficulty. Patient has been doing physical therapy with mild relief, he does note improved strength. MRI results reviewed with patient. Plan of care discussed with patient.     Objective Findings:    Left shoulder: No obvious deformities. Neg erythema, edema  or ecchymosis.  Moderate tenderness to palpation to the lateral joint line and rotator cuff region. Distal neurovascular sensation .  Range of motion is preserved but painful, particularly with flexion and extension of the shoulder as well as abduction.   strength is 4/5.        MRI left shoulder 5/5/20:    IMPRESSION:     1.  Supraspinatus greater than infraspinatus full thickness tendon tears with up to 2.5 cm retraction. No fatty atrophy     2.  Mild subscapularis tendinopathy     3.  Moderate subdeltoid bursitis     4.  Moderate AC, mild glenohumeral joint osteoarthritis   Pre-Existing Condition(s):     Assessment:   Condition Same    Status: Discharged / Care Transfer  Permanent Disability:No    Plan: Transfer CareMedication    Diagnostics:      Comments:  Follow-up in 3 weeks, if not seen by Orthopedics  Restricted duty, per orthopedics  Transfer care to orthopedics, referral placed  Pend physical therapy appointments, until cleared by Orthopedics   Continue with OTC naproxen as needed   Take cycloben  zaprine as prescribed DO NOT DRIVE or WORK on this medication  Continue with heat and ice as needed   Continue with gentle range of motion and stretching exercises as tolerated   Try topical OTC ointments/creams as needed for symptoms     Disability Information   Status: Released to Restricted Duty    From:  5/13/2020  Through:   Restrictions are: Temporary   Physical Restrictions   Sitting:    Standing:    Stooping:    Bending:      Squatting:    Walking:    Climbing:    Pushing:      Pulling:    Other:    Reaching Above Shoulder (L): < or = to 1 hr/day Reaching Above Shoulder (R):       Reaching Below Shoulder (L):    Reaching Below Shoulder (R):      Not to exceed Weight Limits   Carrying(hrs):   Weight Limit(lb): < or = to 10 pounds  Comments:Left arm only Lifting(hrs):   Weight  Limit(lb): < or = to 10 pounds  Comments:Left arm only   Comments:      Repetitive Actions   Hands: i.e. Fine Manipulations  from Grasping: < or = to 1 hr/day  Comments:Left arm only   Feet: i.e. Operating Foot Controls:     Driving / Operate Machinery:     Physician Name: LANCE Johnson Physician Signature:   e-Signature: Dr. Amando Jain, Medical Director   Clinic Name / Location: [address]  [place] [address] Clinic Phone Number: Dept: [phone]   Appointment Time: 1:00 Pm Visit Start Time: 12:46 PM   Check-In Time:  12:41 Pm Visit Discharge Time: 1:22 PM    Original-Treating Physician or Chiropractor    Page 2-Insurer/TPA    Page 3-Employer    Page 4-Employee

## 2020-05-13 NOTE — PROGRESS NOTES
"Subjective:      Ashwin Barrios is a 60 y.o. male who presents with Follow-Up (WC 03/13/2020 left shoulder same - RM 06)      Date of injury 3/12/2000 2020.  This is his first visit in urgent care.  He was lifting a pallet at work and felt a pain and pop in his left shoulder.  He has had pain ever since.  He has tried some over-the-counter ibuprofen sporadically.  Denies any distal paresthesias.  He has not been icing it.  He does note lifting makes it worse     Today no improvement. Pain is described as pulling sensation, radiating, sharp, weakness, numbness in the left thumb, and achy. Patient reports that his shoulder is now locking in place at random times. Patient has not pertinent negatives at this time. Patient is taking Naproxen every other day with mild relief. Patient did have one day of relief after the Medrol Dosepak, but the pain returned instantly. Patient is using both heat and ice with moderate relief. Patient is also using Salonpas pads with no relief. Patient is tolerating work without difficulty. Patient has been doing physical therapy with mild relief, he does note improved strength. MRI results reviewed with patient. Plan of care discussed with patient.       HPI    Review of Systems   Constitutional: Negative.    Respiratory: Negative.    Cardiovascular: Negative.    Musculoskeletal: Positive for joint pain and myalgias.   Skin: Negative.    Neurological: Positive for sensory change and weakness. Negative for tingling.   Psychiatric/Behavioral: Negative.         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     Appearance: Normal appearance. He is not ill-appearing.   Cardiovascular:      Rate and Rhythm: Normal " rate and regular rhythm.      Pulses: Normal pulses.   Pulmonary:      Effort: Pulmonary effort is normal.   Musculoskeletal:         General: Tenderness and signs of injury present. No swelling or deformity.   Skin:     General: Skin is warm and dry.      Capillary Refill: Capillary refill takes less than 2 seconds.      Findings: No bruising.   Neurological:      General: No focal deficit present.      Mental Status: He is alert and oriented to person, place, and time.      Cranial Nerves: No cranial nerve deficit.      Sensory: No sensory deficit.      Motor: Weakness present.      Coordination: Coordination normal.      Gait: Gait normal.      Deep Tendon Reflexes: Reflexes normal.   Psychiatric:         Mood and Affect: Mood normal.         Behavior: Behavior normal.            Left shoulder: No obvious deformities. Neg erythema, edema or ecchymosis.  Moderate tenderness to palpation to the lateral joint line and rotator cuff region. Distal neurovascular sensation .  Range of motion is preserved but painful, particularly with flexion and extension of the shoulder as well as abduction.   strength is 4/5.        MRI left shoulder 5/5/20:    IMPRESSION:     1.  Supraspinatus greater than infraspinatus full thickness tendon tears with up to 2.5 cm retraction. No fatty atrophy     2.  Mild subscapularis tendinopathy     3.  Moderate subdeltoid bursitis     4.  Moderate AC, mild glenohumeral joint osteoarthritis       Assessment/Plan:       1. Biceps tendinitis of left upper extremity    - REFERRAL TO ORTHOPEDICS  - cyclobenzaprine (FLEXERIL) 5 MG tablet; Take 1-2 Tabs by mouth 3 times a day as needed.  Dispense: 30 Tab; Refill: 0    2. Strain of left shoulder, subsequent encounter    - REFERRAL TO ORTHOPEDICS  - cyclobenzaprine (FLEXERIL) 5 MG tablet; Take 1-2 Tabs by mouth 3 times a day as needed.  Dispense: 30 Tab; Refill: 0    3. Tear of left supraspinatus tendon    - REFERRAL TO ORTHOPEDICS  -  cyclobenzaprine (FLEXERIL) 5 MG tablet; Take 1-2 Tabs by mouth 3 times a day as needed.  Dispense: 30 Tab; Refill: 0    Follow-up in 3 weeks, if not seen by Orthopedics   Restricted duty, per orthopedics   Transfer care to orthopedics, referral placed   Pend physical therapy appointments, until cleared by Orthopedics   Continue with OTC naproxen as needed   Take cyclobenzaprine as prescribed DO NOT DRIVE or WORK on this medication   Continue with heat and ice as needed   Continue with gentle range of motion and stretching exercises as tolerated   Try topical OTC ointments/creams as needed for symptoms

## 2020-12-15 ENCOUNTER — OFFICE VISIT (OUTPATIENT)
Dept: URGENT CARE | Facility: PHYSICIAN GROUP | Age: 60
End: 2020-12-15

## 2020-12-15 VITALS
WEIGHT: 172 LBS | TEMPERATURE: 98 F | RESPIRATION RATE: 16 BRPM | OXYGEN SATURATION: 98 % | HEIGHT: 73 IN | BODY MASS INDEX: 22.8 KG/M2 | HEART RATE: 92 BPM | SYSTOLIC BLOOD PRESSURE: 132 MMHG | DIASTOLIC BLOOD PRESSURE: 88 MMHG

## 2020-12-15 DIAGNOSIS — Z02.4 ENCOUNTER FOR CDL (COMMERCIAL DRIVING LICENSE) EXAM: Primary | ICD-10-CM

## 2020-12-15 PROCEDURE — 7100 PR DOT PHYSICAL: Performed by: PHYSICIAN ASSISTANT

## 2020-12-15 NOTE — PROGRESS NOTES
Subjective:      Pt is a 60 y.o. male who presents with Employment Physical            HPI  This is a new problem. 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 Pt denies taking any medication. Pt states they have been in good health with no infections or illnesses lately. PT denies significant PMH and PSH. Pt denies CP, SOB, NVD, paresthesias, headaches, dizziness, change in vision, hives, or joint pain. Pt states current pain is 0/10. The pt's medication list, problem list, and allergies have been evaluated and reviewed during today's visit.    PMH:  Negative per pt.      PSH:  Negative per pt.      Fam Hx:  the patient's family history is not pertinent to their current complaint    Soc HX:  Social History     Socioeconomic History   • Marital status: Unknown     Spouse name: Not on file   • Number of children: Not on file   • Years of education: Not on file   • Highest education level: Not on file   Occupational History   • Not on file   Social Needs   • Financial resource strain: Not on file   • Food insecurity     Worry: Not on file     Inability: Not on file   • Transportation needs     Medical: Not on file     Non-medical: Not on file   Tobacco Use   • Smoking status: Current Every Day Smoker   • Smokeless tobacco: Former User   Substance and Sexual Activity   • Alcohol use: Yes     Frequency: 2-4 times a month     Comment: Seldom   • Drug use: No   • Sexual activity: Not on file   Lifestyle   • Physical activity     Days per week: Not on file     Minutes per session: Not on file   • Stress: Not on file   Relationships   • Social connections     Talks on phone: Not on file     Gets together: Not on file     Attends Taoism service: Not on file     Active member of club or organization: Not on file     Attends meetings of clubs or organizations: Not on file     Relationship status: Not on file   • Intimate partner violence     Fear of current or ex partner: Not on file     Emotionally abused: Not on file      "Physically abused: Not on file     Forced sexual activity: Not on file   Other Topics Concern   • Not on file   Social History Narrative   • Not on file         Medications:    Current Outpatient Medications:   •  Multiple Vitamin (MULTI VITAMIN DAILY PO), Take  by mouth., Disp: , Rfl:       Allergies:  Patient has no known allergies.    ROS  Constitutional: Negative for fever, chills and malaise/fatigue.   HENT: Negative for congestion and sore throat.    Eyes: Negative for blurred vision, double vision and photophobia.   Respiratory: Negative for cough and shortness of breath.  Cardiovascular: Negative for chest pain and palpitations.   Gastrointestinal: Negative for heartburn, nausea, vomiting, abdominal pain, diarrhea and constipation.   Genitourinary: Negative for dysuria and flank pain.   Musculoskeletal: Negative for joint pain and myalgias.   Skin: Negative for itching and rash.   Neurological: Negative for dizziness, tingling and headaches.   Endo/Heme/Allergies: Does not bruise/bleed easily.   Psychiatric/Behavioral: Negative for depression. The patient is not nervous/anxious.           Objective:     /88 (BP Location: Right arm, Patient Position: Sitting, BP Cuff Size: Adult)   Pulse 92   Temp 36.7 °C (98 °F) (Temporal)   Resp 16   Ht 1.854 m (6' 1\")   Wt 78 kg (172 lb)   SpO2 98%   BMI 22.69 kg/m²      Physical Exam  Constitutional: PT is oriented to person, place, and time. PT appears well-developed and well-nourished. No distress.   HENT:   Head: Normocephalic and atraumatic.   Mouth/Throat: Oropharynx is clear and moist. No oropharyngeal exudate.   Eyes: Conjunctivae normal and EOM are normal. Pupils are equal, round, and reactive to light.   Neck: Normal range of motion. Neck supple. No thyromegaly present.   Cardiovascular: Normal rate, regular rhythm, normal heart sounds and intact distal pulses.  Exam reveals no gallop and no friction rub.    No murmur heard.  Pulmonary/Chest: Effort " normal and breath sounds normal. No respiratory distress. PT has no wheezes. PT has no rales. Pt exhibits no tenderness.   Abdominal: Soft. Bowel sounds are normal. PT exhibits no distension and no mass. There is no tenderness. There is no rebound and no guarding.   Musculoskeletal: Normal range of motion. PT exhibits no edema and no tenderness.   Neurological: PT is alert and oriented to person, place, and time. PT has normal reflexes. No cranial nerve deficit.   Skin: Skin is warm and dry. No rash noted. PT is not diaphoretic. No erythema.       Psychiatric: PT has a normal mood and affect. PT behavior is normal. Judgment and thought content normal.             Assessment/Plan:        1. Encounter for CDL (commercial driving license) exam    Please see scanned DOT physical form.  Pt is cleared for 2 yr recert at this time.  AVS with medical info given.  Pt was in full understanding and agreement with the plan.  Follow-up as needed if symptoms worsen or fail to improve.